# Patient Record
Sex: MALE | Race: WHITE | NOT HISPANIC OR LATINO | Employment: OTHER | URBAN - NONMETROPOLITAN AREA
[De-identification: names, ages, dates, MRNs, and addresses within clinical notes are randomized per-mention and may not be internally consistent; named-entity substitution may affect disease eponyms.]

---

## 2023-08-09 ENCOUNTER — OFFICE VISIT (OUTPATIENT)
Dept: FAMILY MEDICINE CLINIC | Facility: CLINIC | Age: 28
End: 2023-08-09
Payer: COMMERCIAL

## 2023-08-09 VITALS
BODY MASS INDEX: 26.28 KG/M2 | DIASTOLIC BLOOD PRESSURE: 82 MMHG | HEIGHT: 72 IN | WEIGHT: 194 LBS | SYSTOLIC BLOOD PRESSURE: 132 MMHG

## 2023-08-09 DIAGNOSIS — J30.9 ALLERGIC RHINITIS, UNSPECIFIED SEASONALITY, UNSPECIFIED TRIGGER: ICD-10-CM

## 2023-08-09 DIAGNOSIS — R05.3 CHRONIC COUGH: Primary | ICD-10-CM

## 2023-08-09 DIAGNOSIS — D22.9 MULTIPLE NEVI: ICD-10-CM

## 2023-08-09 DIAGNOSIS — Z13.1 DIABETES MELLITUS SCREENING: ICD-10-CM

## 2023-08-09 DIAGNOSIS — Z13.220 SCREENING CHOLESTEROL LEVEL: ICD-10-CM

## 2023-08-09 DIAGNOSIS — Z13.29 THYROID DISORDER SCREEN: ICD-10-CM

## 2023-08-09 PROCEDURE — 99204 OFFICE O/P NEW MOD 45 MIN: CPT | Performed by: PHYSICIAN ASSISTANT

## 2023-08-09 RX ORDER — FLUTICASONE PROPIONATE 50 MCG
2 SPRAY, SUSPENSION (ML) NASAL DAILY
Qty: 9.9 ML | Refills: 2 | Status: SHIPPED | OUTPATIENT
Start: 2023-08-09

## 2023-08-09 RX ORDER — LEVOCETIRIZINE DIHYDROCHLORIDE 5 MG/1
5 TABLET, FILM COATED ORAL EVERY EVENING
Qty: 30 TABLET | Refills: 2 | Status: SHIPPED | OUTPATIENT
Start: 2023-08-09

## 2023-08-09 NOTE — PROGRESS NOTES
New Patient Office Visit      Date: 2023   Patient Name: Durga Lal  : 1995   MRN: [id number]     Chief Complaint:    Chief Complaint   Patient presents with    Establish Care     Patient is here to establish care    Cough     On going cough for years       History of Present Illness: Durga Lal is a 28 y.o. male who is here today to establish care.  He c/o cough for the last few years.  He states that sometimes it is dry and sometimes it is productive.  He denies any shortness of breath, tightness in his chest, weight loss, history of asthma, or heartburn.  He states that he has the cough throughout the day, not just at night.  He admits that he does have some seasonal allergies, but he also usually has to clear out drainage every morning when he gets out of bed.  He states that it sounds like a smoker's cough.  He has never been a smoker, but he does admit that he usually smokes about 3 to 4 cigars a year.  He also admits that he was exposed to secondhand smoke by both of his parents throughout his childhood.  He states that this cough has also started after having COVID twice.  He states that he is fully vaccinated now, but both times were in 2020.  He states that he has also been working in [email or web address] without a mask.  He states that he has not really taken anything for his cough, but he does take Claritin occasionally when he has an allergy flareup.  He does not take any medications on a regular basis.    He also would like to have some moles checked.  He states that he has a lot of moles, and he does not think they have really changed very much.  However, he states that some of them do get caught frequently and bleed, so he feels like they have had some changes related to the repetitive irritation.    He has also not seen a primary care provider for any blood work or preventative care.    Subjective      Review of Systems:   Review of Systems   Constitutional:  Negative for activity  "change, appetite change, chills, diaphoresis, fatigue, fever, unexpected weight gain and unexpected weight loss.   HENT:  Positive for congestion (in the morning), postnasal drip and rhinorrhea (in the morning). Negative for sore throat and trouble swallowing.    Respiratory:  Positive for cough. Negative for apnea, choking, chest tightness, shortness of breath and wheezing.    Cardiovascular:  Negative for chest pain and palpitations.   Gastrointestinal:  Negative for GERD and indigestion.   Skin:  Positive for skin lesions.   Allergic/Immunologic: Positive for environmental allergies.     Past Medical History: History reviewed. No pertinent past medical history.    Past Surgical History: History reviewed. No pertinent surgical history.    Family History: History reviewed. No pertinent family history.    Social History:   Social History     Socioeconomic History    Marital status: Single   Tobacco Use    Smoking status: Former     Types: Cigarettes   Vaping Use    Vaping Use: Never used   Substance and Sexual Activity    Alcohol use: Yes    Drug use: Never    Sexual activity: Defer       Medications:     Current Outpatient Medications:     fluticasone (FLONASE) 50 MCG/ACT nasal spray, 2 sprays into the nostril(s) as directed by provider Daily., Disp: 9.9 mL, Rfl: 2    levocetirizine (XYZAL) 5 MG tablet, Take 1 tablet by mouth Every Evening., Disp: 30 tablet, Rfl: 2    Allergies:   No Known Allergies    Objective     Physical Exam:  Vital Signs:   Vitals:    08/09/23 0824   BP: 132/82   BP Location: Left arm   Patient Position: Sitting   Cuff Size: Adult   Weight: 88 kg (194 lb)   Height: 182.9 cm (72\")     Body mass index is 26.31 kg/mý.    Physical Exam  Vitals and nursing note reviewed.   Constitutional:       General: He is not in acute distress.     Appearance: Normal appearance. He is not ill-appearing.   HENT:      Head: Normocephalic and atraumatic.      Right Ear: Ear canal and external ear normal. There is " no impacted cerumen.      Left Ear: Ear canal and external ear normal. There is no impacted cerumen.      Nose: Nose normal.      Mouth/Throat:      Mouth: Mucous membranes are moist.      Pharynx: Oropharynx is clear. Posterior oropharyngeal erythema present. No oropharyngeal exudate.   Eyes:      Extraocular Movements: Extraocular movements intact.      Conjunctiva/sclera: Conjunctivae normal.      Pupils: Pupils are equal, round, and reactive to light.   Cardiovascular:      Rate and Rhythm: Normal rate and regular rhythm.      Pulses: Normal pulses.      Heart sounds: Normal heart sounds.   Pulmonary:      Effort: Pulmonary effort is normal. No respiratory distress.      Breath sounds: Normal breath sounds.   Abdominal:      General: Abdomen is flat. Bowel sounds are normal. There is no distension.      Palpations: Abdomen is soft.      Tenderness: There is no abdominal tenderness. There is no right CVA tenderness, left CVA tenderness or guarding.   Musculoskeletal:         General: Normal range of motion.      Cervical back: Normal range of motion and neck supple.      Right lower leg: No edema.      Left lower leg: No edema.   Lymphadenopathy:      Cervical: No cervical adenopathy.   Skin:     Comments: Numerous flat and papular nevi all over her torso, some irregularities in color and shape   Neurological:      General: No focal deficit present.      Mental Status: He is alert and oriented to person, place, and time.      Motor: No weakness.     Results:   PHQ-9 Total Score: 0        Assessment / Plan      Assessment/Plan:   Diagnoses and all orders for this visit:    1. Chronic cough (Primary)  Assessment & Plan:  His cough has been ongoing for about 3 years without ending specific alleviating or exacerbating factors or progression.  I recommend a chest x-ray, and he is in agreement. We discussed the differential including allergies, reflux, and asthma being the most likely in his case.  We will try  medication for allergies since he does have symptoms, and then proceed further pending the x-ray.  He is in agreement with this plan.    Orders:  -     XR Chest 2 View; Future  -     CBC Auto Differential; Future  -     Comprehensive Metabolic Panel; Future  -     CBC Auto Differential  -     Comprehensive Metabolic Panel    2. Multiple nevi  Assessment & Plan:  Due to his numerous moles, I do recommend that he see dermatology to fully evaluate and monitor them for changes.  I do not see any that are particularly suspicious at this point.  He agrees to specialty care, so we will schedule him with dermatology.    Orders:  -     Ambulatory Referral to Dermatology    3. Allergic rhinitis, unspecified seasonality, unspecified trigger  Assessment & Plan:  He does have quite a bit of drainage in his throat, especially when he wakes up in the morning.  I do believe that the drainage is likely playing a role in his cough if not the cause of it.  We will do a trial of allergy medication and nose spray and then reevaluate symptoms.  He is in agreement to try it.    Orders:  -     fluticasone (FLONASE) 50 MCG/ACT nasal spray; 2 sprays into the nostril(s) as directed by provider Daily.  Dispense: 9.9 mL; Refill: 2  -     levocetirizine (XYZAL) 5 MG tablet; Take 1 tablet by mouth Every Evening.  Dispense: 30 tablet; Refill: 2    4. Screening cholesterol level  -     Lipid Panel; Future  -     Lipid Panel    5. Thyroid disorder screen  -     Thyroid Cascade Profile; Future  -     Thyroid Cascade Profile    6. Diabetes mellitus screening  -     Hemoglobin A1c; Future  -     Hemoglobin A1c         Follow Up:   Return in about 4 weeks (around 9/6/2023) for Annual Physical.    Shauna Patton PA-C  [facility]

## 2023-08-10 LAB
ALBUMIN SERPL-MCNC: 4.7 G/DL (ref 4.3–5.2)
ALBUMIN/GLOB SERPL: 1.7 {RATIO} (ref 1.2–2.2)
ALP SERPL-CCNC: 77 IU/L (ref 44–121)
ALT SERPL-CCNC: 29 IU/L (ref 0–44)
AST SERPL-CCNC: 25 IU/L (ref 0–40)
BASOPHILS # BLD AUTO: 0.1 X10E3/UL (ref 0–0.2)
BASOPHILS NFR BLD AUTO: 1 %
BILIRUB SERPL-MCNC: 0.6 MG/DL (ref 0–1.2)
BUN SERPL-MCNC: 11 MG/DL (ref 6–20)
BUN/CREAT SERPL: 13 (ref 9–20)
CALCIUM SERPL-MCNC: 9.6 MG/DL (ref 8.7–10.2)
CHLORIDE SERPL-SCNC: 102 MMOL/L (ref 96–106)
CHOLEST SERPL-MCNC: 242 MG/DL (ref 100–199)
CO2 SERPL-SCNC: 24 MMOL/L (ref 20–29)
CREAT SERPL-MCNC: 0.86 MG/DL (ref 0.76–1.27)
EGFRCR SERPLBLD CKD-EPI 2021: 122 ML/MIN/1.73
EOSINOPHIL # BLD AUTO: 0.2 X10E3/UL (ref 0–0.4)
EOSINOPHIL NFR BLD AUTO: 2 %
ERYTHROCYTE [DISTWIDTH] IN BLOOD BY AUTOMATED COUNT: 12.3 % (ref 11.6–15.4)
GLOBULIN SER CALC-MCNC: 2.7 G/DL (ref 1.5–4.5)
GLUCOSE SERPL-MCNC: 64 MG/DL (ref 70–99)
HBA1C MFR BLD: 5 % (ref 4.8–5.6)
HCT VFR BLD AUTO: 50.7 % (ref 37.5–51)
HDLC SERPL-MCNC: 69 MG/DL
HGB BLD-MCNC: 17.6 G/DL (ref 13–17.7)
IMM GRANULOCYTES # BLD AUTO: 0.1 X10E3/UL (ref 0–0.1)
IMM GRANULOCYTES NFR BLD AUTO: 1 %
LDLC SERPL CALC-MCNC: 132 MG/DL (ref 0–99)
LYMPHOCYTES # BLD AUTO: 2.1 X10E3/UL (ref 0.7–3.1)
LYMPHOCYTES NFR BLD AUTO: 20 %
MCH RBC QN AUTO: 30.6 PG (ref 26.6–33)
MCHC RBC AUTO-ENTMCNC: 34.7 G/DL (ref 31.5–35.7)
MCV RBC AUTO: 88 FL (ref 79–97)
MONOCYTES # BLD AUTO: 0.6 X10E3/UL (ref 0.1–0.9)
MONOCYTES NFR BLD AUTO: 6 %
NEUTROPHILS # BLD AUTO: 7.2 X10E3/UL (ref 1.4–7)
NEUTROPHILS NFR BLD AUTO: 70 %
PLATELET # BLD AUTO: 272 X10E3/UL (ref 150–450)
POTASSIUM SERPL-SCNC: 4.1 MMOL/L (ref 3.5–5.2)
PROT SERPL-MCNC: 7.4 G/DL (ref 6–8.5)
RBC # BLD AUTO: 5.76 X10E6/UL (ref 4.14–5.8)
SODIUM SERPL-SCNC: 140 MMOL/L (ref 134–144)
TRIGL SERPL-MCNC: 234 MG/DL (ref 0–149)
TSH SERPL DL<=0.005 MIU/L-ACNC: 0.86 UIU/ML (ref 0.45–4.5)
VLDLC SERPL CALC-MCNC: 41 MG/DL (ref 5–40)
WBC # BLD AUTO: 10.3 X10E3/UL (ref 3.4–10.8)

## 2023-08-10 NOTE — ASSESSMENT & PLAN NOTE
His cough has been ongoing for about 3 years without ending specific alleviating or exacerbating factors or progression.  I recommend a chest x-ray, and he is in agreement. We discussed the differential including allergies, reflux, and asthma being the most likely in his case.  We will try medication for allergies since he does have symptoms, and then proceed further pending the x-ray.  He is in agreement with this plan.

## 2023-08-10 NOTE — ASSESSMENT & PLAN NOTE
Due to his numerous moles, I do recommend that he see dermatology to fully evaluate and monitor them for changes.  I do not see any that are particularly suspicious at this point.  He agrees to specialty care, so we will schedule him with dermatology.

## 2023-08-10 NOTE — ASSESSMENT & PLAN NOTE
He does have quite a bit of drainage in his throat, especially when he wakes up in the morning.  I do believe that the drainage is likely playing a role in his cough if not the cause of it.  We will do a trial of allergy medication and nose spray and then reevaluate symptoms.  He is in agreement to try it.

## 2024-06-06 ENCOUNTER — TELEPHONE (OUTPATIENT)
Dept: FAMILY MEDICINE CLINIC | Facility: CLINIC | Age: 29
End: 2024-06-06

## 2024-06-06 ENCOUNTER — TELEPHONE (OUTPATIENT)
Dept: FAMILY MEDICINE CLINIC | Facility: CLINIC | Age: 29
End: 2024-06-06
Payer: COMMERCIAL

## 2024-06-06 NOTE — TELEPHONE ENCOUNTER
Caller: Durga Lal    Relationship to patient: Self    Best call back number:     [phone]       Chief complaint: ABDOMINAL PAIN, BLOOD IN STOOL, WEAKNESS    Patient directed to call 911 or go to their nearest emergency room.     Patient verbalized understanding: [x] Yes  [] No  If no, why?

## 2024-06-06 NOTE — TELEPHONE ENCOUNTER
Caller: Durga Lal    Relationship to patient: Self    Best call back number: [phone]     Chief complaint: COLITIS FLARE UP    Type of visit: HOSPITAL FOLLOW UP/ED FOLLOW UP    Requested date: ASAP     If rescheduling, when is the original appointment: 6/20/24     Additional notes: PATIENT [place] [facility] [place]. BUT THEY GAVE HIM AMOXICILLIN. HE IS CONCERNED THAT MEDICATION WILL MAKE HIS FLARE UP WORSE.    PLEASE ADVISE IF YOU CAN GET HIM WORKED IN ASAP. HE'S IN PAIN.  THANK YOU

## 2024-06-12 ENCOUNTER — OFFICE VISIT (OUTPATIENT)
Dept: FAMILY MEDICINE CLINIC | Facility: CLINIC | Age: 29
End: 2024-06-12
Payer: COMMERCIAL

## 2024-06-12 VITALS
OXYGEN SATURATION: 97 % | BODY MASS INDEX: 26.66 KG/M2 | SYSTOLIC BLOOD PRESSURE: 134 MMHG | DIASTOLIC BLOOD PRESSURE: 82 MMHG | TEMPERATURE: 98.4 F | HEART RATE: 100 BPM | WEIGHT: 196.6 LBS

## 2024-06-12 DIAGNOSIS — K51.911 ULCERATIVE COLITIS WITH RECTAL BLEEDING, UNSPECIFIED LOCATION: Primary | ICD-10-CM

## 2024-06-12 DIAGNOSIS — K62.5 BRBPR (BRIGHT RED BLOOD PER RECTUM): ICD-10-CM

## 2024-06-12 DIAGNOSIS — R11.0 NAUSEA: ICD-10-CM

## 2024-06-12 PROBLEM — K52.9 COLITIS: Status: ACTIVE | Noted: 2024-06-12

## 2024-06-12 PROBLEM — K51.90 ULCERATIVE COLITIS: Status: ACTIVE | Noted: 2024-06-12

## 2024-06-12 PROCEDURE — 96372 THER/PROPH/DIAG INJ SC/IM: CPT | Performed by: PHYSICIAN ASSISTANT

## 2024-06-12 PROCEDURE — 99214 OFFICE O/P EST MOD 30 MIN: CPT | Performed by: PHYSICIAN ASSISTANT

## 2024-06-12 RX ORDER — TRIAMCINOLONE ACETONIDE 40 MG/ML
80 INJECTION, SUSPENSION INTRA-ARTICULAR; INTRAMUSCULAR ONCE
Status: COMPLETED | OUTPATIENT
Start: 2024-06-12 | End: 2024-06-12

## 2024-06-12 RX ORDER — AMOXICILLIN 875 MG/1
TABLET, COATED ORAL
COMMUNITY
Start: 2024-06-06 | End: 2024-06-12

## 2024-06-12 RX ORDER — SUCRALFATE 1 G/1
1 TABLET ORAL 4 TIMES DAILY
Qty: 45 TABLET | Refills: 1 | Status: SHIPPED | OUTPATIENT
Start: 2024-06-12

## 2024-06-12 RX ADMIN — TRIAMCINOLONE ACETONIDE 80 MG: 40 INJECTION, SUSPENSION INTRA-ARTICULAR; INTRAMUSCULAR at 15:07

## 2024-06-12 NOTE — ASSESSMENT & PLAN NOTE
Abdominal discomfort and nausea likely secondary to Augmentin and possibly steroid taper.  Will discontinue the medication and prescribed Carafate

## 2024-06-12 NOTE — ASSESSMENT & PLAN NOTE
Given patient has not had GI follow-up for around 15 years provided him with referral to gastroenterology.  Patient provided with Kenalog injection this date.  Patient will discontinue Augmentin and Medrol Dosepak secondary to abdominal discomfort.  Discussed signs of worsening symptoms and advise ER should they occur.  Patient knowledge understanding.

## 2024-06-13 NOTE — PROGRESS NOTES
"Chief Complaint  Hospital Follow Up Visit    Subjective        Durga Lal presents to [facility]  History of Present Illness  Patient reports today for follow-up secondary to going to [facility] ER last week.   Patient reports going to ER secondary to having bright red blood per rectum for about a month.  Patient also reports he has been having lower abdominal pain and nausea.  Patient states he was diagnosed with ulcerative colitis as a teenager.  Patient states he often has episodes of rectal bleeding however they usually go away within a few weeks.  Patient states he has been taking probiotics however no relief.  Patient reports being provided with CT scan at ER and was advised he has colitis.  Patient reports being prescribed Augmentin and a steroid taper.  Patient reports since starting the medication his abdominal pain worsened along with nausea.  Patient denies any fever or chills.  Patient reports no relief in his rectal bleeding since starting the antibiotic and steroid.      Objective   Vital Signs:  /82   Pulse 100   Temp 98.4 °F (36.9 °C) (Infrared)   Wt 89.2 kg (196 lb 9.6 oz)   SpO2 97%   BMI 26.66 kg/m²   Estimated body mass index is 26.66 kg/m² as calculated from the following:    Height as of 8/9/23: 182.9 cm (72\").    Weight as of this encounter: 89.2 kg (196 lb 9.6 oz).             Physical Exam  Vitals and nursing note reviewed.   Constitutional:       General: He is not in acute distress.     Appearance: Normal appearance. He is not ill-appearing.   HENT:      Head: Normocephalic.      Right Ear: Hearing normal.      Left Ear: Hearing normal.   Eyes:      Pupils: Pupils are equal, round, and reactive to light.   Cardiovascular:      Rate and Rhythm: Normal rate and regular rhythm.      Pulses: Normal pulses.   Pulmonary:      Effort: Pulmonary effort is normal. No respiratory distress.   Abdominal:      General: Bowel sounds are normal. There " is no distension.      Palpations: Abdomen is soft.      Tenderness: There is no abdominal tenderness. There is no guarding or rebound.   Musculoskeletal:         General: Normal range of motion.   Skin:     General: Skin is warm and dry.   Neurological:      Mental Status: He is alert.   Psychiatric:         Mood and Affect: Mood normal.        Result Review :                     Assessment and Plan     Diagnoses and all orders for this visit:    1. Ulcerative colitis with rectal bleeding, unspecified location (Primary)  Assessment & Plan:  Given patient has not had GI follow-up for around 15 years provided him with referral to gastroenterology.  Patient provided with Kenalog injection this date.  Patient will discontinue Augmentin and Medrol Dosepak secondary to abdominal discomfort.  Discussed signs of worsening symptoms and advise ER should they occur.  Patient knowledge understanding.    Orders:  -     triamcinolone acetonide (KENALOG-40) injection 80 mg  -     sucralfate (Carafate) 1 g tablet; Take 1 tablet by mouth 4 (Four) Times a Day.  Dispense: 45 tablet; Refill: 1  -     Ambulatory Referral to Gastroenterology    2. Nausea  Assessment & Plan:  Abdominal discomfort and nausea likely secondary to Augmentin and possibly steroid taper.  Will discontinue the medication and prescribed Carafate      3. BRBPR (bright red blood per rectum)  Assessment & Plan:  See plan above    Orders:  -     Ambulatory Referral to Gastroenterology             Follow Up     Return if symptoms worsen or fail to improve.  Patient was given instructions and counseling regarding his condition or for health maintenance advice. Please see specific information pulled into the AVS if appropriate.

## 2024-06-21 ENCOUNTER — TELEPHONE (OUTPATIENT)
Dept: FAMILY MEDICINE CLINIC | Facility: CLINIC | Age: 29
End: 2024-06-21

## 2024-06-21 ENCOUNTER — OFFICE VISIT (OUTPATIENT)
Dept: FAMILY MEDICINE CLINIC | Facility: CLINIC | Age: 29
End: 2024-06-21
Payer: COMMERCIAL

## 2024-06-21 VITALS
HEIGHT: 72 IN | DIASTOLIC BLOOD PRESSURE: 64 MMHG | WEIGHT: 196 LBS | BODY MASS INDEX: 26.55 KG/M2 | SYSTOLIC BLOOD PRESSURE: 126 MMHG

## 2024-06-21 DIAGNOSIS — K51.911 ULCERATIVE COLITIS WITH RECTAL BLEEDING, UNSPECIFIED LOCATION: Primary | ICD-10-CM

## 2024-06-21 DIAGNOSIS — R10.10 UPPER ABDOMINAL PAIN: ICD-10-CM

## 2024-06-21 PROCEDURE — 99214 OFFICE O/P EST MOD 30 MIN: CPT | Performed by: PHYSICIAN ASSISTANT

## 2024-06-21 RX ORDER — OMEPRAZOLE 20 MG/1
20 CAPSULE, DELAYED RELEASE ORAL DAILY
Qty: 30 CAPSULE | Refills: 1 | Status: SHIPPED | OUTPATIENT
Start: 2024-06-21

## 2024-06-21 RX ORDER — HYDROCORTISONE ACETATE 25 MG/1
25 SUPPOSITORY RECTAL 2 TIMES DAILY
Qty: 60 SUPPOSITORY | Refills: 1 | Status: SHIPPED | OUTPATIENT
Start: 2024-06-21

## 2024-06-21 RX ORDER — ONDANSETRON 4 MG/1
4 TABLET, FILM COATED ORAL EVERY 8 HOURS PRN
Qty: 20 TABLET | Refills: 0 | Status: SHIPPED | OUTPATIENT
Start: 2024-06-21

## 2024-06-21 NOTE — TELEPHONE ENCOUNTER
Caller: Durga Lal    Relationship: Self    Best call back number: [phone]     What medication are you requesting: PROVIDER RECOMMENDATION     What are your current symptoms: ULCERATIVE COLITIS FLARE UP - AFTER EATING SOLID FOOD SEVERE PAIN, BLEEDING, FEVER      How long have you been experiencing symptoms: 2 MONTHS     If a prescription is needed, what is your preferred pharmacy and phone number: [facility] [id number] [place] [address] [address]  [phone] [facility] [phone] FX     Additional notes: PATIENT IS NEEDING TO HAVE SOMETHING DONE ASAP DUE TO THE BLOOD HE IS LOSING.     PLEASE CALL BACK.     TRANSFERRED TO THE OFFICE DUE TO SYMPTOMS AND DENIED ER.

## 2024-06-21 NOTE — TELEPHONE ENCOUNTER
Caller: Durga Lal    Relationship: Self    Best call back number:  [phone]     What is the medical concern/diagnosis: 2 MONTH LONG ULCERATIVE COLITIS FLARE UP     What specialty or service is being requested: GASTRO/NEEDS COLONOSCOPY    What is the provider, practice or medical service name:     DR FRED BARBER  [facility] [phone]      DR REINA MELTON [place]  [phone]      Any additional details: PRIOR LOCATIONS CANNOT GET PATIENT IN UNTIL MID AUGUST. PATIENT STATED HE CANNOT WAIT.

## 2024-06-21 NOTE — TELEPHONE ENCOUNTER
Caller: Durga Lal    Relationship: Self    Best call back number: [phone]     What medication are you requesting: SOMETHING DIFFERENT THAN THE ANUSOL. NOT COVERD BY INSURANCE     What are your current symptoms: ULCERITIVE COLITIS     How long have you been experiencing symptoms: ?    Have you had these symptoms before:    [x] Yes  [] No    Have you been treated for these symptoms before:   [x] Yes  [] No    If a prescription is needed, what is your preferred pharmacy and phone number: [facility] [id number] [place] [id number] [facility] [address] - [phone] [facility] [phone] FX     Additional notes:  PATIENT SAID MEDICAITON CALLED IN TODAY WAS NOT COVERED BY INSURANCE AND WAS $1000. WOULD LIKE TO TRY ENTLC. WILLING TO TRY WHAT EVER PCP THINKS AND THAT WOULD BE COVERED UNDER INSURANCE

## 2024-06-24 PROBLEM — R10.10 UPPER ABDOMINAL PAIN: Status: ACTIVE | Noted: 2024-06-24

## 2024-06-24 NOTE — PROGRESS NOTES
"Chief Complaint  Rectal Bleeding    Subjective        Durga Lal presents to [facility]  History of Present Illness  Patient reports today for follow-up secondary to having ulcerative colitis flareup.  Patient reports he was provided with steroid injection and sucralfate last visit.  Patient states he had decrease in the amount of rectal bleeding for 2 days.  Patient states however rectal bleeding then returned.  Patient states for the past few days he has had new symptoms.  Reports he has been having worsened upper central abdominal pain with meals.  Reports nausea and states he has been unable to eat secondary to symptoms.  Patient reports having a low-grade fever on day 1 of symptoms however it then subsided.  Reports taking sucralfate however no relief of symptoms.  Rectal Bleeding    Abdominal Pain  Associated symptoms include hematochezia.       Objective   Vital Signs:  /64   Ht 182.9 cm (72\")   Wt 88.9 kg (196 lb)   BMI 26.58 kg/m²   Estimated body mass index is 26.58 kg/m² as calculated from the following:    Height as of this encounter: 182.9 cm (72\").    Weight as of this encounter: 88.9 kg (196 lb).             Physical Exam  Vitals and nursing note reviewed.   Constitutional:       General: He is not in acute distress.     Appearance: He is not ill-appearing.   Cardiovascular:      Rate and Rhythm: Normal rate and regular rhythm.      Pulses: Normal pulses.   Pulmonary:      Effort: Pulmonary effort is normal. No respiratory distress.   Abdominal:      General: Bowel sounds are normal. There is no distension.      Palpations: Abdomen is soft.      Tenderness: There is abdominal tenderness. There is no guarding or rebound.      Comments: Mild upper central abdominal pain   Musculoskeletal:         General: Normal range of motion.   Skin:     General: Skin is warm and dry.   Psychiatric:         Mood and Affect: Mood normal.        Result Review :                "      Assessment and Plan     Diagnoses and all orders for this visit:    1. Ulcerative colitis with rectal bleeding, unspecified location (Primary)  Assessment & Plan:  Given patient reports little improvement with Kenalog, prescribed hydrocortisone suppositories for flareup.  Patient is tender in upper central abdominal region prescribed omeprazole.  Discontinue sucralfate.  Continue to take Zofran as tolerated.  CT scan of abdomen ordered this date secondary to continuation of symptoms.  Discussed signs of worsening symptoms and advised anesthetic care.  Patient notes understanding.    Orders:  -     hydrocortisone (ANUSOL-HC) 25 MG suppository; Insert 1 suppository into the rectum 2 (Two) Times a Day.  Dispense: 60 suppository; Refill: 1  -     ondansetron (Zofran) 4 MG tablet; Take 1 tablet by mouth Every 8 (Eight) Hours As Needed for Vomiting or Nausea.  Dispense: 20 tablet; Refill: 0  -     omeprazole (priLOSEC) 20 MG capsule; Take 1 capsule by mouth Daily.  Dispense: 30 capsule; Refill: 1  -     CT Abdomen Pelvis Without Contrast; Future  -     Ambulatory Referral to Gastroenterology    2. Upper abdominal pain  Assessment & Plan:  Could be acid reflux.  Discontinue sucralfate and prescribed omeprazole.               Follow Up     Return if symptoms worsen or fail to improve.  Patient was given instructions and counseling regarding his condition or for health maintenance advice. Please see specific information pulled into the AVS if appropriate.

## 2024-06-24 NOTE — ASSESSMENT & PLAN NOTE
Given patient reports little improvement with Kenalog, prescribed hydrocortisone suppositories for flareup.  Patient is tender in upper central abdominal region prescribed omeprazole.  Discontinue sucralfate.  Continue to take Zofran as tolerated.  CT scan of abdomen ordered this date secondary to continuation of symptoms.  Discussed signs of worsening symptoms and advised anesthetic care.  Patient notes understanding.

## 2024-06-25 ENCOUNTER — TELEPHONE (OUTPATIENT)
Dept: FAMILY MEDICINE CLINIC | Facility: CLINIC | Age: 29
End: 2024-06-25
Payer: COMMERCIAL

## 2024-06-26 ENCOUNTER — TELEPHONE (OUTPATIENT)
Dept: FAMILY MEDICINE CLINIC | Facility: CLINIC | Age: 29
End: 2024-06-26
Payer: COMMERCIAL

## 2024-06-26 NOTE — TELEPHONE ENCOUNTER
Patient received voicemail, and said that someone asked him if he checked to see if the places he looked into accepted his insurance. He says he checked if the locations accepted his insurance and they do, he request that whoever attempted to contact him to reach out again so he can discuss further.  Phone # [phone]

## 2024-06-26 NOTE — TELEPHONE ENCOUNTER
Caller: Durga Lal    Relationship: Self    Best call back number: [phone]     What is the best time to reach you: ANY    What was the call regarding: PATIENT WAS REFERRED TO [facility] FOR COLONOSCOPY. [facility] ALERTED PATIENT THEY DO NOT COVER SERVICES THERE. PATIENT ASKING TO SEND A REFERRAL AND SCHEDULE AT A LOCATION THAT IS COVERED BY HIS INSURANCE.    POSSIBLE LOCATIONS ARE:  [facility] - DR CHARLEEN JAMIL- PHONE: [phone] [place]                 OR    DR MINE YORK PHONE: [phone] [facility]      PLEASE CONFIRM THAT HIS INSURANCE WILL COVER. NOTIFY PATIENT WITH NEW REFERRAL DETAILS    Is it okay if the provider responds through [email or web address]: YES

## 2024-12-03 ENCOUNTER — TELEPHONE (OUTPATIENT)
Dept: FAMILY MEDICINE CLINIC | Facility: CLINIC | Age: 29
End: 2024-12-03
Payer: COMMERCIAL

## 2024-12-03 NOTE — TELEPHONE ENCOUNTER
Left pt a VM to call back. *HUB can relate* Called pt to see if pt has been able to get CT of abdomen done and if not the order is still active and pt can still get that done per pcp Andie's current CT order.